# Patient Record
Sex: FEMALE | URBAN - METROPOLITAN AREA
[De-identification: names, ages, dates, MRNs, and addresses within clinical notes are randomized per-mention and may not be internally consistent; named-entity substitution may affect disease eponyms.]

---
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
## 2025-01-27 NOTE — PROCEDURE: TREATMENT REGIMEN
27-Jan-2025 23:56
Continue Regimen: Tretinoin 0.1% QHS\\n
Detail Level: Zone
Samples Given: Cerave\\nAveeno
Initiate Treatment: Minocycline 100mg BID